# Patient Record
Sex: MALE | Race: WHITE | NOT HISPANIC OR LATINO | Employment: UNEMPLOYED | URBAN - METROPOLITAN AREA
[De-identification: names, ages, dates, MRNs, and addresses within clinical notes are randomized per-mention and may not be internally consistent; named-entity substitution may affect disease eponyms.]

---

## 2023-04-08 PROBLEM — J30.9 CHRONIC ALLERGIC RHINITIS: Status: ACTIVE | Noted: 2023-04-08

## 2023-04-08 PROBLEM — R35.1 NOCTURIA: Status: ACTIVE | Noted: 2023-04-08

## 2023-04-08 PROBLEM — L20.9 ATOPIC DERMATITIS: Status: ACTIVE | Noted: 2023-04-08

## 2023-04-08 PROBLEM — F84.0 ACTIVE AUTISTIC DISORDER (HHS-HCC): Status: ACTIVE | Noted: 2023-04-08

## 2023-04-08 PROBLEM — R26.9 GAIT DISTURBANCE: Status: ACTIVE | Noted: 2023-04-08

## 2023-04-08 PROBLEM — R35.0 URINE FREQUENCY: Status: ACTIVE | Noted: 2023-04-08

## 2023-04-08 PROBLEM — E78.5 HYPERLIPIDEMIA: Status: ACTIVE | Noted: 2023-04-08

## 2023-04-08 PROBLEM — K20.90 ESOPHAGITIS: Status: ACTIVE | Noted: 2023-04-08

## 2023-04-08 PROBLEM — E66.3 OVERWEIGHT WITH BODY MASS INDEX (BMI) OF 26 TO 26.9 IN ADULT: Status: ACTIVE | Noted: 2023-04-08

## 2023-04-08 PROBLEM — K59.09 CHRONIC CONSTIPATION: Status: ACTIVE | Noted: 2023-04-08

## 2023-04-08 RX ORDER — POLYETHYLENE GLYCOL 3350 17 G/17G
POWDER, FOR SOLUTION ORAL
COMMUNITY
Start: 2019-08-29

## 2023-04-08 RX ORDER — MOMETASONE FUROATE 1 MG/G
CREAM TOPICAL
COMMUNITY
Start: 2019-10-11

## 2023-04-08 RX ORDER — LORAZEPAM 1 MG/1
TABLET ORAL DAILY PRN
COMMUNITY
Start: 2021-04-13

## 2023-04-08 RX ORDER — DOCUSATE SODIUM 100 MG/1
1 CAPSULE, LIQUID FILLED ORAL 2 TIMES DAILY
COMMUNITY
Start: 2019-08-29

## 2023-04-08 RX ORDER — FLUOXETINE HYDROCHLORIDE 20 MG/1
1 CAPSULE ORAL 2 TIMES DAILY
COMMUNITY
Start: 2019-05-22

## 2023-04-08 RX ORDER — TAMSULOSIN HYDROCHLORIDE 0.4 MG/1
0.8 CAPSULE ORAL
COMMUNITY
Start: 2017-03-10 | End: 2024-04-09 | Stop reason: SDUPTHER

## 2023-04-08 RX ORDER — NYSTATIN 100000 U/G
CREAM TOPICAL
COMMUNITY
Start: 2021-02-11

## 2023-04-08 RX ORDER — OLANZAPINE 10 MG/1
1 TABLET ORAL DAILY
COMMUNITY

## 2023-04-10 ENCOUNTER — OFFICE VISIT (OUTPATIENT)
Dept: PRIMARY CARE | Facility: CLINIC | Age: 53
End: 2023-04-10
Payer: MEDICARE

## 2023-04-10 VITALS
HEART RATE: 65 BPM | WEIGHT: 162 LBS | TEMPERATURE: 97.1 F | DIASTOLIC BLOOD PRESSURE: 72 MMHG | OXYGEN SATURATION: 96 % | HEIGHT: 69 IN | BODY MASS INDEX: 23.99 KG/M2 | SYSTOLIC BLOOD PRESSURE: 118 MMHG

## 2023-04-10 DIAGNOSIS — F84.0 ACTIVE AUTISTIC DISORDER (HHS-HCC): ICD-10-CM

## 2023-04-10 DIAGNOSIS — Z00.00 MEDICARE ANNUAL WELLNESS VISIT, SUBSEQUENT: Primary | ICD-10-CM

## 2023-04-10 DIAGNOSIS — R35.0 URINE FREQUENCY: ICD-10-CM

## 2023-04-10 PROCEDURE — 1036F TOBACCO NON-USER: CPT | Performed by: FAMILY MEDICINE

## 2023-04-10 PROCEDURE — G0439 PPPS, SUBSEQ VISIT: HCPCS | Performed by: FAMILY MEDICINE

## 2023-04-17 RX ORDER — TAMSULOSIN HYDROCHLORIDE 0.4 MG/1
0.8 CAPSULE ORAL
Qty: 30 CAPSULE | Refills: 3 | Status: CANCELLED | OUTPATIENT
Start: 2023-04-17

## 2023-04-18 NOTE — TELEPHONE ENCOUNTER
Pt mom came in stating he got his last dose of his ramulose today needs it refilled to [facility] [place] asap

## 2023-04-23 PROBLEM — R26.9 GAIT DISTURBANCE: Status: RESOLVED | Noted: 2023-04-08 | Resolved: 2023-04-23

## 2023-04-23 PROBLEM — E66.3 OVERWEIGHT WITH BODY MASS INDEX (BMI) OF 26 TO 26.9 IN ADULT: Status: RESOLVED | Noted: 2023-04-08 | Resolved: 2023-04-23

## 2023-04-23 PROBLEM — Z00.00 MEDICARE ANNUAL WELLNESS VISIT, SUBSEQUENT: Status: ACTIVE | Noted: 2023-04-23

## 2023-04-23 ASSESSMENT — ENCOUNTER SYMPTOMS
LOSS OF SENSATION IN FEET: 0
DEPRESSION: 0
OCCASIONAL FEELINGS OF UNSTEADINESS: 0

## 2023-04-23 ASSESSMENT — PATIENT HEALTH QUESTIONNAIRE - PHQ9
SUM OF ALL RESPONSES TO PHQ9 QUESTIONS 1 AND 2: 0
2. FEELING DOWN, DEPRESSED OR HOPELESS: NOT AT ALL
1. LITTLE INTEREST OR PLEASURE IN DOING THINGS: NOT AT ALL

## 2023-04-23 ASSESSMENT — ACTIVITIES OF DAILY LIVING (ADL)
DOING_HOUSEWORK: NEEDS ASSISTANCE
TAKING_MEDICATION: TOTAL CARE
GROCERY_SHOPPING: NEEDS ASSISTANCE
MANAGING_FINANCES: TOTAL CARE
DRESSING: INDEPENDENT
BATHING: INDEPENDENT

## 2023-04-23 NOTE — PROGRESS NOTES
Subjective   Reason for Visit: Wilber Ayala is an 53 y.o. male here for a Medicare Wellness visit.     Past Medical, Surgical, and Family History reviewed and updated in chart.    Reviewed all medications by prescribing practitioner or clinical pharmacist (such as prescriptions, OTCs, herbal therapies and supplements) and documented in the medical record.    Very pleasant 53-year-old here today with his father and main caregiver he is developmentally disabled with active autistic disorder his parents care for him they do have a long-term care plan in place for him he feels well has no complaints except for the chronic urinary frequency which seems to be controlled with the Flomax he does not get up at night is much and I would like you to refill otherwise doing well appetite is good stays active in [employer] no new concerns        Patient Self Assessment of Health Status  Patient Self Assessment: Good    Nutrition and Exercise  Current Diet: Well Balanced Diet  Adequate Fluid Intake: Yes  Caffeine: Yes  Exercise Frequency: Infrequently    Functional Ability/Level of Safety  Cognitive Impairment Observed: No cognitive impairment observed  Cognitive Impairment Reported: No cognitive impairment reported by patient or family    Home Safety Risk Factors: None         Patient Care Team:  Melissa Pena MD as PCP - General     Review of Systems  Constitutional: no chills, no fever and no night sweats.   Eyes: no blurred vision and no eyesight problems.   ENT: no hearing loss, no nasal congestion, no nasal discharge, no hoarseness and no sore throat.   Cardiovascular: no chest pain, no intermittent leg claudication, no lower extremity edema, no palpitations and no syncope.   Respiratory: no cough, no shortness of breath during exertion, no shortness of breath at rest and no wheezing.   Gastrointestinal: no abdominal pain, no blood in stools, no constipation, no diarrhea, no melena, no nausea, no rectal pain and no  "vomiting.   Genitourinary: no dysuria, no change in urinary frequency, no urinary hesitancy, no feelings of urinary urgency and no vaginal discharge.   Musculoskeletal: no arthralgias,  no back pain and no myalgias.   Integumentary: no new skin lesions and no rashes.   Neurological: no difficulty walking, no headache, no limb weakness, no numbness and no tingling.   Psychiatric: no anxiety, no depression, no anhedonia and no substance use disorders.   Endocrine: no recent weight gain and no recent weight loss.   Hematologic/Lymphatic: no tendency for easy bruising and no swollen glands .  Objective     Vitals:  /72 (BP Location: Right arm, Patient Position: Sitting, BP Cuff Size: Adult)   Pulse 65   Temp 36.2 °C (97.1 °F) (Temporal)   Ht 1.753 m (5' 9\")   Wt 73.5 kg (162 lb)   SpO2 96%   BMI 23.92 kg/m²       Physical Exam  The patient appeared well nourished and normally developed. Vital signs as documented. Head exam is unremarkable. No scleral icterus or corneal arcus noted.  Pupils are equal round reactive to light extraocular movements are intact no hemorrhages noted on funduscopic exam mouth mucous membranes are moist no exudates ears canals clear TMs are gray pearly not injected nose no rhinorrhea or epistaxis Neck is without jugular venous distension, thyromegaly, or carotid bruits. Carotid upstrokes are brisk bilaterally. Lungs are clear to auscultation and percussion. Cardiac exam reveals the PMI to be normally sized and situated. Rhythm is regular. First and second heart sounds normal. No murmurs, rubs or gallops. Abdominal exam reveals normal bowel sounds, no masses, no organomegaly and no aortic enlargement. Extremities are nonedematous and both femoral and pedal pulses are normal.  Neurologic exam DTRs are equal bilaterally no focal deficits strength is symmetrical heme lymph no palpable lymph nodes in the neck axilla or groin  Assessment/Plan   Problem List Items Addressed This Visit       "    Other    Active autistic disorder    Current Assessment & Plan     Stable  Good support system         Urine frequency    Current Assessment & Plan     Most likely behavioral  Continue Flomax  Stable based on symptoms and exam         Medicare annual wellness visit, subsequent - Primary    Current Assessment & Plan     Annual wellness exam  Continue healthy diet and exercise

## 2024-04-09 ENCOUNTER — OFFICE VISIT (OUTPATIENT)
Dept: PRIMARY CARE | Facility: CLINIC | Age: 54
End: 2024-04-09
Payer: MEDICARE

## 2024-04-09 VITALS
WEIGHT: 161 LBS | OXYGEN SATURATION: 94 % | SYSTOLIC BLOOD PRESSURE: 130 MMHG | DIASTOLIC BLOOD PRESSURE: 78 MMHG | HEIGHT: 66 IN | TEMPERATURE: 97.1 F | HEART RATE: 76 BPM | BODY MASS INDEX: 25.88 KG/M2

## 2024-04-09 DIAGNOSIS — N40.1 BENIGN PROSTATIC HYPERPLASIA WITH URINARY FREQUENCY: ICD-10-CM

## 2024-04-09 DIAGNOSIS — Z00.00 MEDICARE ANNUAL WELLNESS VISIT, SUBSEQUENT: Primary | ICD-10-CM

## 2024-04-09 DIAGNOSIS — F84.0 ACTIVE AUTISTIC DISORDER (HHS-HCC): ICD-10-CM

## 2024-04-09 DIAGNOSIS — R35.0 BENIGN PROSTATIC HYPERPLASIA WITH URINARY FREQUENCY: ICD-10-CM

## 2024-04-09 PROBLEM — K20.90 ESOPHAGITIS: Status: RESOLVED | Noted: 2023-04-08 | Resolved: 2024-04-09

## 2024-04-09 PROBLEM — E78.2 MIXED HYPERLIPIDEMIA: Status: ACTIVE | Noted: 2023-04-08

## 2024-04-09 PROCEDURE — 1036F TOBACCO NON-USER: CPT | Performed by: FAMILY MEDICINE

## 2024-04-09 PROCEDURE — G0439 PPPS, SUBSEQ VISIT: HCPCS | Performed by: FAMILY MEDICINE

## 2024-04-09 RX ORDER — TAMSULOSIN HYDROCHLORIDE 0.4 MG/1
0.8 CAPSULE ORAL
Qty: 180 CAPSULE | Refills: 3 | Status: SHIPPED | OUTPATIENT
Start: 2024-04-09

## 2024-04-09 RX ORDER — FLUOXETINE HYDROCHLORIDE 20 MG/1
20 CAPSULE ORAL 2 TIMES DAILY
Status: CANCELLED | OUTPATIENT
Start: 2024-04-09

## 2024-04-09 ASSESSMENT — ACTIVITIES OF DAILY LIVING (ADL)
DRESSING: INDEPENDENT
GROCERY_SHOPPING: INDEPENDENT
BATHING: INDEPENDENT
MANAGING_FINANCES: INDEPENDENT
DOING_HOUSEWORK: INDEPENDENT

## 2024-04-09 ASSESSMENT — PATIENT HEALTH QUESTIONNAIRE - PHQ9
SUM OF ALL RESPONSES TO PHQ9 QUESTIONS 1 AND 2: 0
1. LITTLE INTEREST OR PLEASURE IN DOING THINGS: NOT AT ALL
2. FEELING DOWN, DEPRESSED OR HOPELESS: NOT AT ALL
2. FEELING DOWN, DEPRESSED OR HOPELESS: NOT AT ALL
SUM OF ALL RESPONSES TO PHQ9 QUESTIONS 1 AND 2: 0
1. LITTLE INTEREST OR PLEASURE IN DOING THINGS: NOT AT ALL

## 2024-04-09 NOTE — PROGRESS NOTES
Subjective   Reason for Visit: Wilber Ayala is an 54 y.o. male here for a Medicare Wellness visit.     Past Medical, Surgical, and Family History reviewed and updated in chart.    Reviewed all medications by prescribing practitioner or clinical pharmacist (such as prescriptions, OTCs, herbal therapies and supplements) and documented in the medical record.    Very pleasant autistic adult developmentally disabled since birth here today for annual wellness exam and follow-up for BPH he urinates excessively but seems to be doing better on the tamsulosin he is not on this for several years no difficulties or side effects sees his psychiatrist for medication to help control his autistic behaviors  No hospital ER visits surgery or interval changes his parents are his main caregiver and they do have a plan for when they can no longer take care of him he was in a smoke-free environment is otherwise healthy and active his mom is with him today all history is obtained from her and she has no concerns        Patient Self Assessment of Health Status  Patient Self Assessment: Good    Nutrition and Exercise  Current Diet: Well Balanced Diet  Adequate Fluid Intake: Yes  Caffeine: No  Exercise Frequency: Regularly    Functional Ability/Level of Safety  Cognitive Impairment Observed: Cognitive impairment observed  Cognitive Impairment Reported: Cognitive impairment reported by patient or family    Home Safety Risk Factors: None         Patient Care Team:  Melissa Pena MD as PCP - General     Review of Systems  Constitutional: no chills, no fever and no night sweats.   Eyes: no blurred vision and no eyesight problems.   ENT: no hearing loss, no nasal congestion, no nasal discharge, no hoarseness and no sore throat.   Cardiovascular: no chest pain, no intermittent leg claudication, no lower extremity edema, no palpitations and no syncope.   Respiratory: no cough, no shortness of breath during exertion, no shortness of breath at  "rest and no wheezing.   Gastrointestinal: no abdominal pain, no blood in stools, no constipation, no diarrhea, no melena, no nausea, no rectal pain and no vomiting.   Genitourinary: no dysuria, no change in urinary frequency, no urinary hesitancy, no feelings of urinary urgency and no vaginal discharge.   Musculoskeletal: no arthralgias,  no back pain and no myalgias.   Integumentary: no new skin lesions and no rashes.   Neurological: no difficulty walking, no headache, no limb weakness, no numbness and no tingling.   Psychiatric: no anxiety, no depression, no anhedonia and no substance use disorders.   Endocrine: no recent weight gain and no recent weight loss.   Hematologic/Lymphatic: no tendency for easy bruising and no swollen glands .    Objective     Vitals:  /78   Pulse 76   Temp 36.2 °C (97.1 °F)   Ht 1.676 m (5' 6\")   Wt 73 kg (161 lb)   SpO2 94%   BMI 25.99 kg/m²       Physical Exam  The patient appeared well nourished and normally developed. Vital signs as documented. Head exam is unremarkable. No scleral icterus or corneal arcus noted.  Pupils are equal round reactive to light extraocular movements are intact no hemorrhages noted on funduscopic exam mouth mucous membranes are moist no exudates ears canals clear TMs are gray pearly not injected nose no rhinorrhea or epistaxis Neck is without jugular venous distension, thyromegaly, or carotid bruits. Carotid upstrokes are brisk bilaterally. Lungs are clear to auscultation and percussion. Cardiac exam reveals the PMI to be normally sized and situated. Rhythm is regular. First and second heart sounds normal. No murmurs, rubs or gallops. Abdominal exam reveals normal bowel sounds, no masses, no organomegaly and no aortic enlargement. Extremities are nonedematous and both femoral and pedal pulses are normal.  Neurologic exam DTRs are equal bilaterally no focal deficits strength is symmetrical heme lymph no palpable lymph nodes in the neck axilla or " groin  Patient is echolalic   Assessment/Plan   Problem List Items Addressed This Visit       Active autistic disorder (HHS-HCC)    Current Assessment & Plan     Stable and unchanged  Continue current medication and management  Still cared for by parents  Continue to follow with psychiatry         Benign prostatic hyperplasia with urinary frequency    Relevant Medications    tamsulosin (Flomax) 0.4 mg 24 hr capsule    Medicare annual wellness visit, subsequent - Primary    Current Assessment & Plan     Stable physical exam  Healthy autistic disabled adult  Continue current medications and management   Continue yearly exams and followup as needed

## 2024-04-29 PROBLEM — J06.9 ACUTE UPPER RESPIRATORY INFECTION: Status: RESOLVED | Noted: 2024-04-29 | Resolved: 2024-04-29

## 2024-04-29 ASSESSMENT — ACTIVITIES OF DAILY LIVING (ADL)
BATHING: INDEPENDENT
GROCERY_SHOPPING: TOTAL CARE
TAKING_MEDICATION: NEEDS ASSISTANCE
MANAGING_FINANCES: TOTAL CARE
DRESSING: INDEPENDENT
DOING_HOUSEWORK: NEEDS ASSISTANCE

## 2024-04-29 ASSESSMENT — PATIENT HEALTH QUESTIONNAIRE - PHQ9
1. LITTLE INTEREST OR PLEASURE IN DOING THINGS: NOT AT ALL
SUM OF ALL RESPONSES TO PHQ9 QUESTIONS 1 AND 2: 0
2. FEELING DOWN, DEPRESSED OR HOPELESS: NOT AT ALL

## 2024-04-30 NOTE — ASSESSMENT & PLAN NOTE
Stable and unchanged  Continue current medication and management  Still cared for by parents  Continue to follow with psychiatry

## 2024-04-30 NOTE — ASSESSMENT & PLAN NOTE
Stable physical exam  Healthy autistic disabled adult  Continue current medications and management   Continue yearly exams and followup as needed

## 2025-04-11 ENCOUNTER — APPOINTMENT (OUTPATIENT)
Dept: PRIMARY CARE | Facility: CLINIC | Age: 55
End: 2025-04-11
Payer: MEDICARE

## 2025-04-11 VITALS
DIASTOLIC BLOOD PRESSURE: 68 MMHG | OXYGEN SATURATION: 95 % | TEMPERATURE: 97.1 F | WEIGHT: 159.4 LBS | BODY MASS INDEX: 25.62 KG/M2 | HEART RATE: 60 BPM | HEIGHT: 66 IN | SYSTOLIC BLOOD PRESSURE: 106 MMHG

## 2025-04-11 DIAGNOSIS — Z12.5 PROSTATE CANCER SCREENING: ICD-10-CM

## 2025-04-11 DIAGNOSIS — Z00.00 MEDICARE ANNUAL WELLNESS VISIT, SUBSEQUENT: Primary | ICD-10-CM

## 2025-04-11 DIAGNOSIS — N40.1 BENIGN PROSTATIC HYPERPLASIA WITH URINARY FREQUENCY: ICD-10-CM

## 2025-04-11 DIAGNOSIS — R35.0 BENIGN PROSTATIC HYPERPLASIA WITH URINARY FREQUENCY: ICD-10-CM

## 2025-04-11 DIAGNOSIS — Z11.59 NEED FOR HEPATITIS C SCREENING TEST: ICD-10-CM

## 2025-04-11 DIAGNOSIS — E78.2 MIXED HYPERLIPIDEMIA: ICD-10-CM

## 2025-04-11 DIAGNOSIS — R73.03 PREDIABETES: ICD-10-CM

## 2025-04-11 NOTE — PROGRESS NOTES
Subjective   Reason for Visit: Wilber Ayala is an 55 y.o. male here for a Medicare Wellness visit.     Past Medical, Surgical, and Family History reviewed and updated in chart.    Reviewed all medications by prescribing practitioner or clinical pharmacist (such as prescriptions, OTCs, herbal therapies and supplements) and documented in the medical record.    Very pleasant 55-year-old active autistic disorder here for annual Medicare wellness exam lives with his parents 1 of caregivers there is a plan for him when they are no longer able to take care of him he has not had any hospital or ER visits he has consistent regular HEP having to go to the bathroom they believe it had it but the Flomax helps he is able to sleep all night he goes to a workshop he is not a smoker he exercises and is active and they have no other new concerns his colon cancer screening is up-to-date        Patient Self Assessment of Health Status  Patient Self Assessment: Good    Nutrition and Exercise  Current Diet: Unhealthy Diet  Adequate Fluid Intake: Yes  Caffeine: No  Exercise Frequency: Regularly    Functional Ability/Level of Safety  Cognitive Impairment Observed: Cognitive impairment observed  Cognitive Impairment Reported: Cognitive impairment reported by patient or family    Home Safety Risk Factors: None         Patient Care Team:  Melissa Pena MD as PCP - General     Review of Systems  Constitutional: no chills, no fever and no night sweats.   Eyes: no blurred vision and no eyesight problems.   ENT: no hearing loss, no nasal congestion, no nasal discharge, no hoarseness and no sore throat.   Cardiovascular: no chest pain, no intermittent leg claudication, no lower extremity edema, no palpitations and no syncope.   Respiratory: no cough, no shortness of breath during exertion, no shortness of breath at rest and no wheezing.   Gastrointestinal: no abdominal pain, no blood in stools, no constipation, no diarrhea, no melena, no  "nausea, no rectal pain and no vomiting.   Genitourinary: no dysuria, no change in urinary frequency, no urinary hesitancy, no feelings of urinary urgency and no vaginal discharge.   Musculoskeletal: no arthralgias,  no back pain and no myalgias.   Integumentary: no new skin lesions and no rashes.   Neurological: no difficulty walking, no headache, no limb weakness, no numbness and no tingling.   Psychiatric: no anxiety, no depression, no anhedonia and no substance use disorders.   Endocrine: no recent weight gain and no recent weight loss.   Hematologic/Lymphatic: no tendency for easy bruising and no swollen glands .    Objective     Vitals:  /68 (Patient Position: Sitting)   Pulse 60   Temp 36.2 °C (97.1 °F) (Temporal)   Ht 1.676 m (5' 6\")   Wt 72.3 kg (159 lb 6.4 oz)   SpO2 95%   BMI 25.73 kg/m²       Physical Exam  The patient appeared well nourished and normally developed. Vital signs as documented. Head exam is unremarkable. No scleral icterus or corneal arcus noted.  Pupils are equal round reactive to light extraocular movements are intact no hemorrhages noted on funduscopic exam mouth mucous membranes are moist no exudates ears canals clear TMs are gray pearly not injected nose no rhinorrhea or epistaxis Neck is without jugular venous distension, thyromegaly, or carotid bruits. Carotid upstrokes are brisk bilaterally. Lungs are clear to auscultation and percussion. Cardiac exam reveals the PMI to be normally sized and situated. Rhythm is regular. First and second heart sounds normal. No murmurs, rubs or gallops. Abdominal exam reveals normal bowel sounds, no masses, no organomegaly and no aortic enlargement. Extremities are nonedematous and both femoral and pedal pulses are normal.  Neurologic exam DTRs are equal bilaterally no focal deficits strength is symmetrical heme lymph no palpable lymph nodes in the neck axilla or groin  deGenerative changes in the joints extremities no edema autistic " echolalia and behaviors  Assessment/Plan   Problem List Items Addressed This Visit       Mixed hyperlipidemia    Relevant Orders    Comprehensive metabolic panel (Completed)    Lipid panel (Completed)    Benign prostatic hyperplasia with urinary frequency    Relevant Medications    tamsulosin (Flomax) 0.4 mg 24 hr capsule    Medicare annual wellness visit, subsequent - Primary    Current Assessment & Plan   Pain annual medical exam  Immunizations up to date  Continue annual exams          Other Visit Diagnoses         Need for hepatitis C screening test        Relevant Orders    Hepatitis C antibody (Completed)      Prostate cancer screening        Relevant Orders    Prostate Spec.Ag,Screen (Completed)      Prediabetes        Relevant Orders    Hemoglobin A1C (Completed)

## 2025-04-14 DIAGNOSIS — R35.0 BENIGN PROSTATIC HYPERPLASIA WITH URINARY FREQUENCY: ICD-10-CM

## 2025-04-14 DIAGNOSIS — N40.1 BENIGN PROSTATIC HYPERPLASIA WITH URINARY FREQUENCY: ICD-10-CM

## 2025-04-14 RX ORDER — TAMSULOSIN HYDROCHLORIDE 0.4 MG/1
0.8 CAPSULE ORAL
Qty: 180 CAPSULE | Refills: 3 | Status: CANCELLED | OUTPATIENT
Start: 2025-04-14

## 2025-04-14 NOTE — TELEPHONE ENCOUNTER
Requested Prescriptions     Pending Prescriptions Disp Refills    tamsulosin (Flomax) 0.4 mg 24 hr capsule 180 capsule 3     Sig: Take 2 capsules (0.8 mg) by mouth once every day. A 1/2 HOUR FOLLOWING THE SAME MEAL EACH DAY     RX pended.

## 2025-04-14 NOTE — TELEPHONE ENCOUNTER
Pt   tamsulosin (Flomax) 0.4 mg 24 hr capsule   Send to:   [id number] - [place], [place] - [address]   LV:  4-11-25  NV:  None sched

## 2025-04-15 RX ORDER — TAMSULOSIN HYDROCHLORIDE 0.4 MG/1
0.8 CAPSULE ORAL
Qty: 180 CAPSULE | Refills: 3 | Status: SHIPPED | OUTPATIENT
Start: 2025-04-15

## 2025-04-18 LAB
ALBUMIN SERPL-MCNC: 3.9 G/DL (ref 3.6–5.1)
ALP SERPL-CCNC: 60 U/L (ref 35–144)
ALT SERPL-CCNC: 27 U/L (ref 9–46)
ANION GAP SERPL CALCULATED.4IONS-SCNC: 7 MMOL/L (CALC) (ref 7–17)
AST SERPL-CCNC: 25 U/L (ref 10–35)
BILIRUB SERPL-MCNC: 0.3 MG/DL (ref 0.2–1.2)
BUN SERPL-MCNC: 16 MG/DL (ref 7–25)
CALCIUM SERPL-MCNC: 8.7 MG/DL (ref 8.6–10.3)
CHLORIDE SERPL-SCNC: 107 MMOL/L (ref 98–110)
CHOLEST SERPL-MCNC: 125 MG/DL
CHOLEST/HDLC SERPL: 2.8 (CALC)
CO2 SERPL-SCNC: 27 MMOL/L (ref 20–32)
CREAT SERPL-MCNC: 1.23 MG/DL (ref 0.7–1.3)
EGFRCR SERPLBLD CKD-EPI 2021: 70 ML/MIN/1.73M2
EST. AVERAGE GLUCOSE BLD GHB EST-MCNC: 117 MG/DL
EST. AVERAGE GLUCOSE BLD GHB EST-SCNC: 6.5 MMOL/L
GLUCOSE SERPL-MCNC: 94 MG/DL (ref 65–99)
HBA1C MFR BLD: 5.7 %
HCV AB SERPL QL IA: NORMAL
HDLC SERPL-MCNC: 45 MG/DL
LDLC SERPL CALC-MCNC: 67 MG/DL (CALC)
NONHDLC SERPL-MCNC: 80 MG/DL (CALC)
POTASSIUM SERPL-SCNC: 4.1 MMOL/L (ref 3.5–5.3)
PROT SERPL-MCNC: 6.1 G/DL (ref 6.1–8.1)
PSA SERPL-MCNC: 0.56 NG/ML
SODIUM SERPL-SCNC: 141 MMOL/L (ref 135–146)
TRIGL SERPL-MCNC: 57 MG/DL

## 2025-04-19 NOTE — RESULT ENCOUNTER NOTE
Please call Parish's family   His bloodwork looks good  His liver and kidney function is normal electrolytes are normal blood sugar is normal cholesterol panel is normal hemoglobin A1c shows no diabetes in his prostate PSA is normal range

## 2025-04-21 ENCOUNTER — TELEPHONE (OUTPATIENT)
Dept: PRIMARY CARE | Facility: CLINIC | Age: 55
End: 2025-04-21
Payer: COMMERCIAL

## 2025-04-21 NOTE — TELEPHONE ENCOUNTER
----- Message from Melissa Pena sent at 4/18/2025 11:04 PM EDT -----  Please call Parish's family   His bloodwork looks good  His liver and kidney function is normal electrolytes are normal blood sugar is normal cholesterol panel is normal hemoglobin A1c shows no diabetes in his prostate PSA is normal range  ----- Message -----  From: Analisa Meizu Results In  Sent: 4/18/2025   5:24 AM EDT  To: Melissa Pena MD

## 2025-04-21 NOTE — TELEPHONE ENCOUNTER
----- Message from Melissa Pena sent at 4/18/2025 11:04 PM EDT -----  Please call Parish's family   His bloodwork looks good  His liver and kidney function is normal electrolytes are normal blood sugar is normal cholesterol panel is normal hemoglobin A1c shows no diabetes in his prostate PSA is normal range  ----- Message -----  From: Analisa KaritKarma Results In  Sent: 4/18/2025   5:24 AM EDT  To: Melissa Pena MD

## 2025-04-27 ASSESSMENT — ACTIVITIES OF DAILY LIVING (ADL)
BATHING: INDEPENDENT
DRESSING: INDEPENDENT
MANAGING_FINANCES: TOTAL CARE
TAKING_MEDICATION: TOTAL CARE
GROCERY_SHOPPING: TOTAL CARE
DOING_HOUSEWORK: TOTAL CARE

## 2025-04-27 ASSESSMENT — PATIENT HEALTH QUESTIONNAIRE - PHQ9
2. FEELING DOWN, DEPRESSED OR HOPELESS: NOT AT ALL
SUM OF ALL RESPONSES TO PHQ9 QUESTIONS 1 AND 2: 0
1. LITTLE INTEREST OR PLEASURE IN DOING THINGS: NOT AT ALL